# Patient Record
Sex: FEMALE | Race: ASIAN | NOT HISPANIC OR LATINO | URBAN - METROPOLITAN AREA
[De-identification: names, ages, dates, MRNs, and addresses within clinical notes are randomized per-mention and may not be internally consistent; named-entity substitution may affect disease eponyms.]

---

## 2017-07-03 ENCOUNTER — OFFICE VISIT - HEALTHEAST (OUTPATIENT)
Dept: FAMILY MEDICINE | Facility: CLINIC | Age: 82
End: 2017-07-03

## 2017-07-03 ENCOUNTER — RECORDS - HEALTHEAST (OUTPATIENT)
Dept: GENERAL RADIOLOGY | Facility: CLINIC | Age: 82
End: 2017-07-03

## 2017-07-03 DIAGNOSIS — R11.0 NAUSEA: ICD-10-CM

## 2017-07-03 DIAGNOSIS — R09.89 PULSE IRREGULARITY: ICD-10-CM

## 2017-07-03 DIAGNOSIS — R53.83 FATIGUE: ICD-10-CM

## 2017-07-03 DIAGNOSIS — R53.83 OTHER FATIGUE: ICD-10-CM

## 2017-07-03 ASSESSMENT — MIFFLIN-ST. JEOR: SCORE: 738.14

## 2017-07-05 LAB
ATRIAL RATE - MUSE: 75 BPM
DIASTOLIC BLOOD PRESSURE - MUSE: NORMAL MMHG
INTERPRETATION ECG - MUSE: NORMAL
P AXIS - MUSE: 30 DEGREES
PR INTERVAL - MUSE: 182 MS
QRS DURATION - MUSE: 80 MS
QT - MUSE: 418 MS
QTC - MUSE: 466 MS
R AXIS - MUSE: 2 DEGREES
SYSTOLIC BLOOD PRESSURE - MUSE: NORMAL MMHG
T AXIS - MUSE: 16 DEGREES
VENTRICULAR RATE- MUSE: 75 BPM

## 2017-09-03 ENCOUNTER — COMMUNICATION - HEALTHEAST (OUTPATIENT)
Dept: FAMILY MEDICINE | Facility: CLINIC | Age: 82
End: 2017-09-03

## 2017-09-03 DIAGNOSIS — K21.9 GERD (GASTROESOPHAGEAL REFLUX DISEASE): ICD-10-CM

## 2019-05-07 ENCOUNTER — COMMUNICATION - HEALTHEAST (OUTPATIENT)
Dept: FAMILY MEDICINE | Facility: CLINIC | Age: 84
End: 2019-05-07

## 2019-05-07 ENCOUNTER — OFFICE VISIT - HEALTHEAST (OUTPATIENT)
Dept: FAMILY MEDICINE | Facility: CLINIC | Age: 84
End: 2019-05-07

## 2019-05-07 DIAGNOSIS — R11.0 NAUSEA: ICD-10-CM

## 2019-05-07 DIAGNOSIS — R76.12 POSITIVE QUANTIFERON-TB GOLD TEST: ICD-10-CM

## 2019-05-07 DIAGNOSIS — R53.83 FATIGUE, UNSPECIFIED TYPE: ICD-10-CM

## 2019-05-07 DIAGNOSIS — R91.8 LUNG INFILTRATE: ICD-10-CM

## 2019-05-07 DIAGNOSIS — K21.9 GERD (GASTROESOPHAGEAL REFLUX DISEASE): ICD-10-CM

## 2019-05-07 LAB
ALBUMIN SERPL-MCNC: 3.7 G/DL (ref 3.5–5)
ALP SERPL-CCNC: 59 U/L (ref 45–120)
ALT SERPL W P-5'-P-CCNC: 13 U/L (ref 0–45)
ANION GAP SERPL CALCULATED.3IONS-SCNC: 10 MMOL/L (ref 5–18)
AST SERPL W P-5'-P-CCNC: 23 U/L (ref 0–40)
BILIRUB SERPL-MCNC: 0.3 MG/DL (ref 0–1)
BUN SERPL-MCNC: 8 MG/DL (ref 8–28)
CALCIUM SERPL-MCNC: 9.3 MG/DL (ref 8.5–10.5)
CHLORIDE BLD-SCNC: 107 MMOL/L (ref 98–107)
CO2 SERPL-SCNC: 24 MMOL/L (ref 22–31)
CREAT SERPL-MCNC: 0.93 MG/DL (ref 0.6–1.1)
ERYTHROCYTE [DISTWIDTH] IN BLOOD BY AUTOMATED COUNT: 13.5 % (ref 11–14.5)
GFR SERPL CREATININE-BSD FRML MDRD: 57 ML/MIN/1.73M2
GLUCOSE BLD-MCNC: 97 MG/DL (ref 70–125)
HCT VFR BLD AUTO: 40.9 % (ref 35–47)
HGB BLD-MCNC: 13.2 G/DL (ref 12–16)
LIPASE SERPL-CCNC: 21 U/L (ref 0–52)
MCH RBC QN AUTO: 29.8 PG (ref 27–34)
MCHC RBC AUTO-ENTMCNC: 32.4 G/DL (ref 32–36)
MCV RBC AUTO: 92 FL (ref 80–100)
PLATELET # BLD AUTO: 219 THOU/UL (ref 140–440)
PMV BLD AUTO: 7.7 FL (ref 7–10)
POTASSIUM BLD-SCNC: 3.4 MMOL/L (ref 3.5–5)
PROT SERPL-MCNC: 7 G/DL (ref 6–8)
RBC # BLD AUTO: 4.44 MILL/UL (ref 3.8–5.4)
SODIUM SERPL-SCNC: 141 MMOL/L (ref 136–145)
TSH SERPL DL<=0.005 MIU/L-ACNC: 5.32 UIU/ML (ref 0.3–5)
WBC: 6.3 THOU/UL (ref 4–11)

## 2019-05-13 ENCOUNTER — COMMUNICATION - HEALTHEAST (OUTPATIENT)
Dept: FAMILY MEDICINE | Facility: CLINIC | Age: 84
End: 2019-05-13

## 2019-05-13 ENCOUNTER — OFFICE VISIT - HEALTHEAST (OUTPATIENT)
Dept: PULMONOLOGY | Facility: OTHER | Age: 84
End: 2019-05-13

## 2019-05-13 DIAGNOSIS — R76.12 POSITIVE QUANTIFERON-TB GOLD TEST: ICD-10-CM

## 2019-05-13 ASSESSMENT — MIFFLIN-ST. JEOR: SCORE: 765.36

## 2019-05-18 ENCOUNTER — AMBULATORY - HEALTHEAST (OUTPATIENT)
Dept: LAB | Facility: HOSPITAL | Age: 84
End: 2019-05-18

## 2019-05-18 DIAGNOSIS — R76.12 POSITIVE QUANTIFERON-TB GOLD TEST: ICD-10-CM

## 2019-05-20 ENCOUNTER — HOSPITAL ENCOUNTER (OUTPATIENT)
Dept: CT IMAGING | Facility: HOSPITAL | Age: 84
Discharge: HOME | End: 2019-05-20
Attending: INTERNAL MEDICINE

## 2019-05-20 DIAGNOSIS — R76.12 POSITIVE QUANTIFERON-TB GOLD TEST: ICD-10-CM

## 2019-05-21 ENCOUNTER — OFFICE VISIT - HEALTHEAST (OUTPATIENT)
Dept: FAMILY MEDICINE | Facility: CLINIC | Age: 84
End: 2019-05-21

## 2019-05-21 DIAGNOSIS — R76.12 POSITIVE QUANTIFERON-TB GOLD TEST: ICD-10-CM

## 2019-05-21 DIAGNOSIS — R51.9 NONINTRACTABLE HEADACHE, UNSPECIFIED CHRONICITY PATTERN, UNSPECIFIED HEADACHE TYPE: ICD-10-CM

## 2019-05-21 DIAGNOSIS — R63.0 ANOREXIA: ICD-10-CM

## 2019-05-21 DIAGNOSIS — R91.8 LUNG INFILTRATE: ICD-10-CM

## 2019-05-21 DIAGNOSIS — E63.9 POOR NUTRITION: ICD-10-CM

## 2019-06-21 ENCOUNTER — OFFICE VISIT - HEALTHEAST (OUTPATIENT)
Dept: PULMONOLOGY | Facility: OTHER | Age: 84
End: 2019-06-21

## 2019-06-21 DIAGNOSIS — R93.89 ABNORMAL CT OF THE CHEST: ICD-10-CM

## 2019-06-21 ASSESSMENT — MIFFLIN-ST. JEOR: SCORE: 760.82

## 2019-07-01 LAB
ACID FAST STN SPEC QL: NORMAL
BACTERIA SPEC CULT: NORMAL

## 2020-08-19 ENCOUNTER — COMMUNICATION - HEALTHEAST (OUTPATIENT)
Dept: FAMILY MEDICINE | Facility: CLINIC | Age: 85
End: 2020-08-19

## 2021-03-19 ENCOUNTER — COMMUNICATION - HEALTHEAST (OUTPATIENT)
Dept: SCHEDULING | Facility: CLINIC | Age: 86
End: 2021-03-19

## 2021-03-19 ENCOUNTER — OFFICE VISIT - HEALTHEAST (OUTPATIENT)
Dept: FAMILY MEDICINE | Facility: CLINIC | Age: 86
End: 2021-03-19

## 2021-03-19 DIAGNOSIS — R22.0 LEFT FACIAL SWELLING: ICD-10-CM

## 2021-03-19 DIAGNOSIS — R21 RASH: ICD-10-CM

## 2021-03-22 ENCOUNTER — COMMUNICATION - HEALTHEAST (OUTPATIENT)
Dept: FAMILY MEDICINE | Facility: CLINIC | Age: 86
End: 2021-03-22

## 2021-03-23 ENCOUNTER — COMMUNICATION - HEALTHEAST (OUTPATIENT)
Dept: FAMILY MEDICINE | Facility: CLINIC | Age: 86
End: 2021-03-23

## 2021-03-23 ENCOUNTER — OFFICE VISIT - HEALTHEAST (OUTPATIENT)
Dept: PHARMACY | Facility: CLINIC | Age: 86
End: 2021-03-23

## 2021-03-23 DIAGNOSIS — B02.22 TRIGEMINAL HERPES ZOSTER: ICD-10-CM

## 2021-03-23 DIAGNOSIS — L03.211 FACIAL CELLULITIS: ICD-10-CM

## 2021-03-23 DIAGNOSIS — I21.4 NSTEMI (NON-ST ELEVATED MYOCARDIAL INFARCTION) (H): ICD-10-CM

## 2021-03-23 DIAGNOSIS — M79.605 PAIN OF LEFT LOWER EXTREMITY: ICD-10-CM

## 2021-03-23 PROCEDURE — 99605 MTMS BY PHARM NP 15 MIN: CPT | Performed by: PHARMACIST

## 2021-03-23 PROCEDURE — 99607 MTMS BY PHARM ADDL 15 MIN: CPT | Performed by: PHARMACIST

## 2021-05-27 VITALS
HEART RATE: 91 BPM | DIASTOLIC BLOOD PRESSURE: 70 MMHG | OXYGEN SATURATION: 95 % | TEMPERATURE: 100.3 F | RESPIRATION RATE: 16 BRPM | SYSTOLIC BLOOD PRESSURE: 108 MMHG

## 2021-05-28 NOTE — TELEPHONE ENCOUNTER
Spoke to Daughter Dejon Evans (CTC on file) and relayed message. They will go  the medication.     Please call patient to schedule for referral.     Completing task.

## 2021-05-28 NOTE — TELEPHONE ENCOUNTER
----- Message from Ana Boss MD sent at 5/13/2019  1:48 PM CDT -----  Regarding: ID consult for latent tb and echo for pleural effusion  Hi,    I just saw above pt.  I reviewed her records and images. She had a left lower lobe density/effusion, which is not typical of tb. She also does not have much in terms of cough, night sweats or weight loss. 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 I would recommend sending her to ID for latent Tb. I would also recommend an echo because of her pleural effusion.     With respect to her density left lower lobe, will be getting a CT chest and see what it shows.    Thanks and please don't hesitate to call me with questions.    Ana

## 2021-05-28 NOTE — PROGRESS NOTES
Subjective: This patient comes in for evaluation of nausea.  Patient has been seen here intermittently over the past number of years.  She was seen by Dr. Walter for a physical/wellness visit back in July 2017.  She had some nausea then was placed on some omeprazole is unclear if she did improve with that.    Her weight then was about 96 pounds she is actually 6 pounds heavier now.    In reviewing old charts she did have a positive QuantiFERON gold in 2015 and Dr. Hannah had talked to the family and the health department.  Patient had a positive PPD with refugee screening back in 2007.    I reviewed the phone message from 2015 and it looks like Dr. Hannah talked to the patient's son and left a message about getting treated here or at the health department    The patient was here with her daughter Rex Thornton believe that she is been treated    She has not really had any chest pain or shortness of breath.    She did have a chest x-ray back in 2017 which was fairly normal.    The patient does not really have reflux but just mainly has nausea.  No vomiting no diarrhea no fever no chills.    Patient has had fatigue and is in bed a lot during the day.    I did check labs today including CBC TSH CMP lipase and also his chest x-ray.  Will contact patient regarding the results    Tobacco status: She  reports that she has never smoked. She has never used smokeless tobacco.    Patient Active Problem List    Diagnosis Date Noted     Vision loss 12/07/2015     Fracture of fourth metacarpal bone, closed, initial encounter 12/07/2015     Risk for falls 12/07/2015     Localized Osteoarthritis Of The Wrist        Current Outpatient Medications   Medication Sig Dispense Refill     levoFLOXacin (LEVAQUIN) 250 MG tablet Take 1 tablet (250 mg total) by mouth daily for 10 days. 10 tablet 0     omeprazole (PRILOSEC) 20 MG capsule TAKE 1 PILL (20 MG TOTAL) BY MOUTH EVERY DAY BEFORE BREAKFAST/ TXHUA HNUB NOJ 1 LUB TSHUAJ UA NTEJ NOJ TSHAIS PAB ZOO  LUB PLAB 30 capsule 1     No current facility-administered medications for this visit.        ROS: 10 point review of systems positive as outlined above otherwise negative    Objective:    /75 (Patient Site: Left Arm, Patient Position: Sitting, Cuff Size: Adult Regular)   Pulse 100   Resp 18   Wt 102 lb (46.3 kg)   BMI 20.96 kg/m    Body mass index is 20.96 kg/m .      General appearance no acute distress    HEENT neck is negative oropharynx clear pupils react normally    Lungs had some diminished breath sounds but no wheezes or rales.    Heart was regular rate     Abdomen nontender no guarding or rebound    Skin was normal no rashes    No peripheral edema    Neck without JVD    No focal weakness or numbness    White count and hemoglobin normal.    TSH CMP lipase pending    Chest x-ray came back showing enlargement of cardiac silhouette.  Also increased opacity both lung bases worse on the left appears to be a lower lobe infiltrate which could represent acute pneumonia.            Results for orders placed or performed in visit on 05/07/19   HM2(CBC w/o Differential)   Result Value Ref Range    WBC 6.3 4.0 - 11.0 thou/uL    RBC 4.44 3.80 - 5.40 mill/uL    Hemoglobin 13.2 12.0 - 16.0 g/dL    Hematocrit 40.9 35.0 - 47.0 %    MCV 92 80 - 100 fL    MCH 29.8 27.0 - 34.0 pg    MCHC 32.4 32.0 - 36.0 g/dL    RDW 13.5 11.0 - 14.5 %    Platelets 219 140 - 440 thou/uL    MPV 7.7 7.0 - 10.0 fL       Assessment:  1. Nausea  Comprehensive Metabolic Panel    Lipase   2. Positive QuantiFERON-TB Gold test  XR Chest 2 Views    Ambulatory referral to Pulmonology   3. Fatigue, unspecified type  HM2(CBC w/o Differential)    Thyroid Stimulating Hormone (TSH)   4. GERD (gastroesophageal reflux disease)  omeprazole (PRILOSEC) 20 MG capsule    DISCONTINUED: omeprazole (PRILOSEC) 20 MG capsule   5. Lung infiltrate  Ambulatory referral to Pulmonology    levoFLOXacin (LEVAQUIN) 250 MG tablet     6.  Enlargement of cardiac  silhouette on chest x-ray    Patient will be started on Levaquin 250 mg 1 a day for 10 days.  I will have her see pulmonary regarding the abnormal chest x-ray and a positive PPD and QuantiFERON gold which is not been treated.    GERD history we will treat empirically with omeprazole.    For follow-up clinic visit in 2 weeks consider echocardiogram    Plan: Await additional labs as well.    This transcription uses voice recognition software, which may contain typographical errors.

## 2021-05-28 NOTE — PROGRESS NOTES
"Pulmonary Clinic Consult Note  Date of Service: 5/13/2019    Reason for Consultation: Abnormal chest x-ray    History:     HPI: Patient is a pleasant 89-year-old female, lifelong, from [facility] was referred here for abnormal chest x-ray.  Apparently, patient had a chest x-ray back on 5/2019 that showed increased opacity at the bases worse on the left, questionable if this is related to pneumonia.  Because patient has positive QuantiFERON back in 2015, she was referred to pulmonary clinic.  Per the notes, patient also appears to have positive PPD back in 2007. She was treated with levaquin 10 day course 5/2019 and sent here for evaluation.     Patient notes that she has shortness of breath with exertion however this is unchanged from baseline.  She denies any chronic cough that she has had for many years.  She denies any hemoptysis.  She denies any night sweats or weight loss.  She denies any previous history of aspiration.    [place].  She has not travelled back to [facility] since. She doesn't know if she ever had positive. She does not recall being treated for latent Tb.     Per chart, pt is 89 yo but she notes that she is 63 years only.    PMHx/PSHx:  GERD  hypothyroidism  Osteoarthritis    Social Hx:  Lifelong non-smoker  Lives with daughter and son in law    Review of Systems - 10 point review of system negative except for what is mentioned in the HPI.    Meds and Allergies:  See EHR for the updated medication list and Allergies. These were reviewed.     No family history on file.      Exam/Data:   /60   Pulse 89   Resp 12   Ht 4' 10.5\" (1.486 m)   Wt 102 lb (46.3 kg)   SpO2 97%   Breastfeeding? No   BMI 20.96 kg/m      EXAM:  GEN: comfortable, NAD  LN: no cervical LAD   CVS: S1S2, RRR  Lung: good air entry bilaterally, no wheezing  Abd: soft, nt, + BS. No masses  Ext: no c/c/e  Vasc: intact radial pulses bilaterally  Neuro: nonfocal  Skin: no visible " rash  Musculoskeletal: FROM all extremities  Psych: normal affect    DATA    IMAGING: personally reviewed images  Xr Chest 2 Views    Result Date: 5/7/2019  EXAM: XR CHEST 2 VIEWS LOCATION: [facility] DATE/TIME: 5/7/2019 4:14 PM INDICATION: Nonspecific reaction to cell mediated immunity measurement of gamma interferon antigen response without active tuberculosis COMPARISON: 07/03/2017 FINDINGS: Convex rightward mid thoracic scoliosis. Interval enlargement of the cardiac silhouette which could be due to multichamber enlargement, or pericardial effusion. Increased opacity both lung bases, worse on the left. On the lateral film there appears to be a lower lobe infiltrate which could represent acute pneumonia. No effusion. Note that primary tuberculosis can appear as an acute pneumonia. Report called to the clinic by QC. NOTE: ABNORMAL REPORT THE DICTATION ABOVE DESCRIBES AN ABNORMALITY FOR WHICH FOLLOW-UP IS NEEDED.       Assessment/Plan:   Norberto Real is a 89 y.o. female, lifelong non-smoker, who had an abnormal chest x-ray showing left lower lobe density in the setting of positive QuantiFERON that was done several years ago.  Patient does not recall ever being treated for lesion tuberculosis.  She does not have any type B symptoms including cough, night sweats, weight loss.  Suspicion for tuberculosis is low.    Recommendations:  Sputum Gstain and culture  Sputum for AFB x 3  CT chest with contrast to further evaluate left lower lobe densitity  Consider ID for possible tx of latent tb  Consider echo to assess pleural effusion  Sent inbasket to primary    FOLLOW UP: 4 weeks    Ana Boss MD  Pulmonary and Critical Care Medicine  5/13/2019        No Known Allergies    Medications:     Current Outpatient Medications   Medication Sig Dispense Refill     levoFLOXacin (LEVAQUIN) 250 MG tablet Take 1 tablet (250 mg total) by mouth daily for 10 days. 10 tablet 0     omeprazole (PRILOSEC) 20 MG capsule TAKE 1 PILL (20  MG TOTAL) BY MOUTH EVERY DAY BEFORE BREAKFAST/ TXHUA HNUB NOJ 1 LUB TSHUAJ UA NTEJ NOJ TSHAIS PAB ZOO LUB PLAB 30 capsule 1     No current facility-administered medications for this visit.        Much or all of the text in this note was generated through the use of the Dragon Dictate voice-to-text software. Errors in spelling or words which seem out of context are unintentional. Sound alike errors, in particular, may have escaped editing.

## 2021-05-28 NOTE — TELEPHONE ENCOUNTER
----- Message from Saurabh Goldstein MD sent at 5/7/2019  4:56 PM CDT -----  Please contact this patient tonight I will start her on Levaquin 250 mg 1 a day for 10 days    Also I would like her to see pulmonary.  I put in a referral.    Please have her get scheduled for that as well    She should follow-up at the clinic here in 2 weeks

## 2021-05-29 ENCOUNTER — RECORDS - HEALTHEAST (OUTPATIENT)
Dept: ADMINISTRATIVE | Facility: CLINIC | Age: 86
End: 2021-05-29

## 2021-05-29 NOTE — PROGRESS NOTES
Subjective: Patient comes in with her daughter.  She was seen back on 5/7/2019.  Please see discussion then she had had some weight loss anorexia cough etc.    Had previous what sounds like untreated latent tuberculosis.  She had a positive PPD many years ago and had a positive QuantiFERON gold few years ago please see previous discussion    Her x-ray was abnormal please see discussion from 5/7/2019    I did treat her for acute infiltrate with Levaquin and had her see the pulmonologist    She saw Dr. Boss.  Patient's sputum's have been negative thus far regarding TB, no acid-fast bacilli is seen.    Patient had a CT scan done yesterday she has not heard the results.    They compared it with the chest x-ray from 5/7/2019 and it looked better improved with resolution of the focal infiltrate.  There is a number of granulomatous changes and fibrosis.    She has a follow-up with pulmonary on 6/21/2019.    I need to see her back for recheck in early July.    All she has had pretty much the same way down a half a pound she continues with poor appetite and fatigue.    I did give her prescriptions for multivitamins and fluid supplement, Ensure.    Also she gets intermittent headaches and I gave her a prescription for Tylenol for that 325 mg 1-2 3 times daily as needed    Tobacco status: She  reports that she has never smoked. She has never used smokeless tobacco.    Patient Active Problem List    Diagnosis Date Noted     Vision loss 12/07/2015     Fracture of fourth metacarpal bone, closed, initial encounter 12/07/2015     Risk for falls 12/07/2015     Localized Osteoarthritis Of The Wrist        Current Outpatient Medications   Medication Sig Dispense Refill     acetaminophen (TYLENOL) 325 MG tablet Take 2 tablets (650 mg total) by mouth every 6 (six) hours as needed for pain. 80 tablet 2     food supplemt, lactose-reduced (ENSURE HIGH PROTEIN) Liqd 1 can po daily 30 Can 12     multivitamin (ONE A DAY) per tablet Take 1  tablet by mouth daily. 120 tablet 2     omeprazole (PRILOSEC) 20 MG capsule TAKE 1 PILL (20 MG TOTAL) BY MOUTH EVERY DAY BEFORE BREAKFAST/ TXHUA HNUB NOJ 1 LUB TSHUAJ UA NTEJ NOJ TSHAIS PAB ZOO LUB PLAB 30 capsule 1     No current facility-administered medications for this visit.        ROS:   10 point review of systems positive as discussed above otherwise negative    Objective:    /78 (Patient Site: Left Arm, Patient Position: Sitting, Cuff Size: Adult Small)   Pulse 60   Resp 12   Wt 101 lb (45.8 kg)   BMI 20.75 kg/m    Body mass index is 20.75 kg/m .      General appearance no acute distress    HEENT no adenopathy oropharynx is clear nose is clear    Heart was regular S1-S2 rate at 60-70    Abdomen nontender    She has no peripheral edema    Lungs had some diminished breath sounds as before but better air exchange.  No rales no rhonchi.    Skin was normal no rashes    AFB stains were negative for acid-fast bacilli x3.    CT scan as discussed showed improvement with resolution of focal infiltrate.    Results for orders placed or performed in visit on 05/18/19   Culture/Stain, Mycobacterium, Respiratory Specimen   Result Value Ref Range    AFB Stain No acid fast bacilli seen    Culture/Stain, Mycobacterium, Respiratory Specimen   Result Value Ref Range    AFB Stain No acid fast bacilli seen    Culture/Stain, Mycobacterium, Respiratory Specimen   Result Value Ref Range    AFB Stain No acid fast bacilli seen        Assessment:  1. Lung infiltrate     2. Anorexia  food supplemt, lactose-reduced (ENSURE HIGH PROTEIN) Liqd    multivitamin (ONE A DAY) per tablet   3. Poor nutrition  food supplemt, lactose-reduced (ENSURE HIGH PROTEIN) Liqd    multivitamin (ONE A DAY) per tablet   4. Nonintractable headache, unspecified chronicity pattern, unspecified headache type  acetaminophen (TYLENOL) 325 MG tablet   5. Positive QuantiFERON-TB Gold test       Lung infiltrate probable recent pneumonia resolved with  Levaquin    Anorexia poor nutrition we will use fluid supplement and multivitamins.    Headache nonspecific can use Tylenol    Previous positive QuantiFERON gold I discussed treatment for latent tuberculosis with patient and daughter.  She would rather not take medications if she does not have an active case of TB.    We will discuss this again when I see her in 6 weeks.  I did discuss the risk benefit regarding that.    Plan: As discussed above    This transcription uses voice recognition software, which may contain typographical errors.

## 2021-05-29 NOTE — PROGRESS NOTES
"PULMONARY CLINIC FOLLOW UP NOTE    History:     HPI: Norberto Real is a 89 y.o. female, lifelong, from [facility], who was referred to pulm clinic b/c of abnormal CXR in setting of positive PPD.  PT had a CXR back on 5/2019 that showed increased opacity at the bases worse on the left, questionable if this is related to pneumonia.  She was given a course of levaquin for 10 days on 5/2019.  Because patient has positive QuantiFERON back in 2015, she was referred to pulmonary clinic.  Per the notes, patient also appears to have positive PPD back in 2007.   [place], and not travelled back to [facility]. She does not recall being treated for latent TB.  Of note, pt is 63 years old however per chart, she is 89 years old.    Interval History: Patient is here for her scheduled visit.  She denies any changes of her symptoms.  She does have shortness of breath and chronic cough that is dry for many years.  These are unchanged.  She denies any functional limitations.  No hemoptysis, night sweats or weight loss.  No antibiotics since he was last seen.    PMHx/PSHx:  GERD  hypothyroidism  Osteoarthritis     Social Hx:  Lifelong non-smoker  Lives with daughter and son in law    ROS: 10 point review of system done. Pertinent findings are noted in the HPI.    Exam/Data:   /60   Pulse 69   Resp 12   Ht 4' 10.5\" (1.486 m)   Wt 101 lb (45.8 kg)   SpO2 96%   Breastfeeding? No   BMI 20.75 kg/m  , Body mass index is 20.75 kg/m .  GEN: comfortable, NAD  HEENT: NCAT, EMOI, mmm  CVS: S1S2, RRR  Lung: good air entry bilaterally  Abd: soft, nt, + BS. No masses  Ext: no c/c/e  Neuro: nonfocal  Skin: no visible rash  Vasc: intact radial pulses bilaterally  Musculoskeletal: FROM all extremities  Psych: normal affect    Data:   Labs and Imaging personally reviewed.  Pertinent findings include:    Result Date: 5/7/2019  EXAM: XR CHEST 2 VIEWS LOCATION: [facility] [place] DATE/TIME: 5/7/2019 4:14 PM INDICATION: " Nonspecific reaction to cell mediated immunity measurement of gamma interferon antigen response without active tuberculosis COMPARISON: 07/03/2017 FINDINGS: Convex rightward mid thoracic scoliosis. Interval enlargement of the cardiac silhouette which could be due to multichamber enlargement, or pericardial effusion. Increased opacity both lung bases, worse on the left. On the lateral film there appears to be a lower lobe infiltrate which could represent acute pneumonia. No effusion. Note that primary tuberculosis can appear as an acute pneumonia. Report called to the clinic by QC. NOTE: ABNORMAL REPORT THE DICTATION ABOVE DESCRIBES AN ABNORMALITY FOR WHICH FOLLOW-UP IS NEEDED.     CT chest on 5/20/2019:  1.  Improvement compared to prior plain film with resolution of focal infiltrate/pleural fluid at left lung base.  2.  Old granulomatous disease exposure with innumerable small pulmonary nodules, upper lobe fibrosis and calcified thoracic lymph nodes are chronic.    Assessment/Plan:     Norberto Real is a 89 y.o. female, lifelong non-smoker, who had an abnormal chest x-ray showing left lower lobe density in the setting of positive QuantiFERON that was done several years ago.  Patient does not recall ever being treated for lesion tuberculosis.  She does not have any type B symptoms including cough, night sweats, weight loss.      AFB x 3 negative arguing against active TB. CT chest shows improvement/resolution in her left sided infiltrate as pt was treated for PNA.  CT shows changes likely related to old tb but unchanged form CT done 13 years although I am not able to see it.    FOLLOW UP: as needed    Ana Boss MD  Pulmonary and Critical Care Medicine  Electronically Signed on 6/21/2019    Current Outpatient Medications   Medication Sig Dispense Refill     acetaminophen (TYLENOL) 325 MG tablet Take 2 tablets (650 mg total) by mouth every 6 (six) hours as needed for pain. 80 tablet 2     food supplemt,  lactose-reduced (ENSURE HIGH PROTEIN) Liqd 1 can po daily 30 Can 12     multivitamin (ONE A DAY) per tablet Take 1 tablet by mouth daily. 120 tablet 2     omeprazole (PRILOSEC) 20 MG capsule TAKE 1 PILL (20 MG TOTAL) BY MOUTH EVERY DAY BEFORE BREAKFAST/ TXHUA HNUB NOJ 1 LUB TSHUAJ UA NTEJ NOJ TSHAIS PAB ZOO LUB PLAB 30 capsule 1     No current facility-administered medications for this visit.      No Known Allergies    Meds and Allergies: See EHR for the updated medication list and Allergies. These were reviewed.     Much or all of the text in this note was generated through the use of the Dragon Dictate voice-to-text software. Errors in spelling or words which seem out of context are unintentional. Sound alike errors, in particular, may have escaped editing.

## 2021-05-31 VITALS — WEIGHT: 96 LBS | BODY MASS INDEX: 19.35 KG/M2 | HEIGHT: 59 IN

## 2021-06-02 VITALS — BODY MASS INDEX: 20.75 KG/M2 | WEIGHT: 101 LBS

## 2021-06-02 VITALS — BODY MASS INDEX: 20.56 KG/M2 | WEIGHT: 102 LBS | HEIGHT: 59 IN

## 2021-06-02 VITALS — WEIGHT: 102 LBS | BODY MASS INDEX: 20.96 KG/M2

## 2021-06-03 VITALS — BODY MASS INDEX: 20.36 KG/M2 | WEIGHT: 101 LBS | HEIGHT: 59 IN

## 2021-06-10 NOTE — TELEPHONE ENCOUNTER
----- Message from Gavin Orourke CMA sent at 6/10/2020  8:17 AM CDT -----      ----- Message -----  From: Cuauhtemoc Carbajal MD  Sent: 6/9/2020   1:11 PM CDT  To: [facility] Family Medicine/Ob Support Pool    Please set up Annual Wellness visit virtual visit.

## 2021-06-10 NOTE — TELEPHONE ENCOUNTER
"Called and left message for pt to return call.  \" Okay to relay message\"  Upon call back please help patient make a AWV with provider. Face to face or Virtual Video  "

## 2021-06-11 NOTE — PROGRESS NOTES
Assessment/ Plan  1. Nausea  2. Fatigue  In 89-year-old elderly female.  Etiology not clear.  Plan is trial of omeprazole, await labs.  Follow-up 1-2 weeks with myself or Dr. Chandra  - Comprehensive Metabolic Panel  - HM2(CBC w/o Differential)  - Electrocardiogram Perform - Clinic  - Erythrocyte Sedimentation Rate  - XR Chest PA and Lateral; Future  - Urinalysis-UC if Indicated  - Culture, Urine    3. Pulse irregularity  Sinus irregularity, await cardiology overread of EKG  - Electrocardiogram Perform - Clinic      Also, briefly discussed pain medication for neck pain.  Recommended ongoing use of Tylenol and add topical salicylate.  Discussed my hesitation to use nonsteroidal medication in a patient of this age with nausea as well as opiates.  Body mass index is 19.72 kg/(m^2).    Subjective  CC:  Chief Complaint   Patient presents with     Annual Exam     loss appitite, dizzy, vomit     Eye Problem     blurry vision     HPI:  Patient lives with her son and family.  Planes of feeling poorly for the last month with nausea, no vomiting and weight loss    Nausea and Vomiting  Narrative-elderly female, gradual onset of feeling poorly.  ___________________________  Notes  Duration/ Timing/ Frequency/context-1 month- comes and goes but more frequent lately.  Doesn't feel like eating    Abdominal Pain? no  Diarrhea? No    Other associated symptoms? Neck tension, energy is okay except with nausea.    Severity?-Comes and goes, at times moderate  Trigger/ anything make it worse?  No  Anything make it better?-No      St is up 1 lbs in the last 18 months, but patient believes that she has lost weight recently    Also complains of neck pain and tension for which she takes Tylenol.  Tylenol is not working well enough.    PFSH:  Current medications reviewed as follows:  Current Outpatient Prescriptions on File Prior to Visit   Medication Sig     ibuprofen 200 mg cap Take 400 mg by mouth every 4 (four) hours as needed (Pain (use  "acetaminophen first).).     No current facility-administered medications on file prior to visit.      Patient Active Problem List    Diagnosis Date Noted     Vision loss 12/07/2015     Fracture of fourth metacarpal bone, closed, initial encounter 12/07/2015     Overview Note:     Replacement Utility updated for latest IMO load       Risk for falls 12/07/2015     Overview Note:     Fall 12/4/15 (with fracture). Vision loss. Suspect osteoporosis.       Localized Osteoarthritis Of The Wrist      Overview Note:     s/p fracture '75    Replacement Utility updated for latest IMO load       History   Smoking Status     Never Smoker   Smokeless Tobacco     Not on file     Comment: No exposure to second hand smoking     Social History     Social History Narrative    Lives with daughter-in-law.     Patient Care Team:  Nenita Lopez MD as PCP - General (Family Medicine)  ROS  Her family, negative constitutional, respiratory, GI, cardiac, skin except as noted    Objective  Physical Exam  Vitals:    07/03/17 1317   BP: 90/50   Pulse: 91   Resp: 20   Temp: 98  F (36.7  C)   TempSrc: Oral   Weight: (!) 96 lb (43.5 kg)   Height: 4' 10.5\" (1.486 m)     Gen- alert, oriented/ appropriately responsive  HEENT- normal cephalic, atraumatic.   Chest- Normal inspiration and expiration.  Clear to ascultation.  No chest wall deformity or scar.  CV- Heart regular rate and rhythm, normal tones, no murmurs gallops or rubs.  Ext- appear well perfused, no edema  Skin- warm and dry, no visualized rash    Diagnostics  Results for orders placed or performed in visit on 07/03/17   HM2(CBC w/o Differential)   Result Value Ref Range    WBC 4.5 4.0 - 11.0 thou/uL    RBC 4.24 3.80 - 5.40 mill/uL    Hemoglobin 12.7 12.0 - 16.0 g/dL    Hematocrit 38.7 35.0 - 47.0 %    MCV 91 80 - 100 fL    MCH 29.8 27.0 - 34.0 pg    MCHC 32.7 32.0 - 36.0 g/dL    RDW 11.7 11.0 - 14.5 %    Platelets 196 140 - 440 thou/uL    MPV 7.3 7.0 - 10.0 fL   Erythrocyte Sedimentation " Rate   Result Value Ref Range    Sed Rate 35 (H) 0 - 20 mm/hr   Urinalysis-UC if Indicated   Result Value Ref Range    Color, UA Yellow Colorless, Yellow, Straw, Light Yellow    Clarity, UA Clear Clear    Glucose, UA Negative Negative    Bilirubin, UA Negative Negative    Ketones, UA Negative Negative    Specific Gravity, UA 1.010 1.005 - 1.030    Blood, UA Small (!) Negative    pH, UA 5.5 5.0 - 8.0    Protein, UA Negative Negative mg/dL    Urobilinogen, UA 0.2 E.U./dL 0.2 E.U./dL, 1.0 E.U./dL    Nitrite, UA Negative Negative    Leukocytes, UA Trace (!) Negative    Bacteria, UA Few (!) None Seen hpf    RBC, UA 3-5 (!) None Seen, 0-2 hpf    WBC, UA 0-5 None Seen, 0-5 hpf    Squam Epithel, UA 0-5 None Seen, 0-5 lpf   Electrocardiogram Perform - Clinic   Result Value Ref Range    SYSTOLIC BLOOD PRESSURE  mmHg    DIASTOLIC BLOOD PRESSURE  mmHg    VENTRICULAR RATE 75 BPM    ATRIAL RATE 75 BPM    P-R INTERVAL 182 ms    QRS DURATION 80 ms    Q-T INTERVAL 418 ms    QTC CALCULATION (BEZET) 466 ms    P Axis 30 degrees    R AXIS 2 degrees    T AXIS 16 degrees    MUSE DIAGNOSIS       Sinus rhythm with Premature atrial complexes in a pattern of bigeminy  Otherwise normal ECG  No previous ECGs available       Reviewed EKG and I question whether these beats are PACs or indeed sinus beats with marked sinus irregularity.  I favor the latter.    Please note: Voice recognition software was used in this dictation.  It may therefore contain typographical errors.

## 2021-06-16 PROBLEM — B02.9 HERPES ZOSTER: Status: ACTIVE | Noted: 2021-03-19

## 2021-06-16 PROBLEM — R79.89 ELEVATED TROPONIN: Status: ACTIVE | Noted: 2021-03-19

## 2021-06-16 NOTE — PATIENT INSTRUCTIONS - HE
Plan:                                                     1.  Educate to initiate aspirin (send refill to pharmacy)  2.  Educate to initiate atorvastatin  3.  Reminded of primary care follow up on 3/25/21    Follow Up  Return in about 2 days (around 3/25/2021) for Dr. Crabajal.

## 2021-06-16 NOTE — TELEPHONE ENCOUNTER
LMTCB #1 to see if patient was interested in getting the COVID-19 Vaccine at [facility] on 3/27 if patient hasn't already.    If patient got it, please notate this information down and re-route it to the provider pool. Thanks.    Name of vaccine:  Place of vaccination:  Date of 1st dose:  Date of 2nd dose:  Lot #: (patient may or may not have this)

## 2021-06-16 NOTE — PROGRESS NOTES
Assessment:     1. Rash     2. Left facial swelling            Plan:     Patient with 4-day history of slowly worsening painful and itchy rash on the left side of her face now involving her eyelid on the left.  Differential diagnosis includes zoster with concern for herpes ophthalmicus versus facial cellulitis.  Given her low-grade fever and degree of redness and swelling of her face and eyelid, recommend patient be seen in the emergency department for further evaluation and treatment.    Assessment requiring an independent historian(s) - family - son-in-law      Patient Instructions   I am concerned about the rash on the left side of your face, particularly the degree of swelling and pain around her eye.  Recommend that you be seen in the emergency department for further evaluation.    Subjective:       89 y.o. female presents for evaluation of a 4-day history of progressively worsening rash that is started on her forehead on the left side of her face and now is to be spreading.  She has developed significant swelling of the left side of her forehead and eyelid to the point that she is unable to open her eye.  She describes the pain initially as itchy and has now become quite painful.  She has also had associated headache, low-grade fever, chills, and body aches for the past couple of days.  She has not taken anything for her symptoms.    Patient Active Problem List   Diagnosis     Localized Osteoarthritis Of The Wrist     Vision loss     Fracture of fourth metacarpal bone, closed, initial encounter     Risk for falls       No past medical history on file.    No past surgical history on file.    Current Outpatient Medications on File Prior to Visit   Medication Sig Dispense Refill     acetaminophen (TYLENOL) 325 MG tablet Take 2 tablets (650 mg total) by mouth every 6 (six) hours as needed for pain. 80 tablet 2     food supplemt, lactose-reduced (ENSURE HIGH PROTEIN) Liqd 1 can po daily 30 Can 12     multivitamin  (ONE A DAY) per tablet Take 1 tablet by mouth daily. 120 tablet 2     omeprazole (PRILOSEC) 20 MG capsule TAKE 1 PILL (20 MG TOTAL) BY MOUTH EVERY DAY BEFORE BREAKFAST/ TXHUA HNUB NOJ 1 LUB TSHUAJ UA NTEJ NOJ TSHAIS PAB ZOO LUB PLAB 30 capsule 1     No current facility-administered medications on file prior to visit.        No Known Allergies      Review of Systems  A 12 point comprehensive review of systems was negative except as noted.      Objective:     Vitals:    03/19/21 1250   BP: 108/70   Pulse: 91   Resp: 16   Temp: 100.3  F (37.9  C)   SpO2: 95%      General appearance: alert, appears stated age and cooperative  Head: Patient noted to have a weepy, vesicular rash on the left side of her forehead with some areas scabbed over with surrounding redness.  Redness extends to involve her left eyelid and has significant degree of swelling around her left eye.  Eyes: Upper and lower eyelid on the left significantly edematous with some mild erythema noted.  Difficult to examine eye because of the large degree of swelling.  Lungs: clear to auscultation bilaterally             This note has been dictated using voice recognition software. Any grammatical or context distortions are unintentional and inherent to the software

## 2021-06-16 NOTE — PATIENT INSTRUCTIONS - HE
I am concerned about the rash on the left side of your face, particularly the degree of swelling and pain around her eye.  Recommend that you be seen in the emergency department for further evaluation.

## 2021-06-16 NOTE — PROGRESS NOTES
Medication Therapy Management (MTM) Encounter    Assessment:                                                      1. Trigeminal herpes zoster  Symptomatically with improvement per patient report, describing adherence to acyclovir without adverse effect or difficulty with administration.  Reminded her of follow-up appointment with primary care in 2 days, and recommendation to follow-up with ophthalmologist.    2. Facial cellulitis  Symptomatically improving per patient report, describing adherence to Augmentin without adverse effect or difficulty with administration.  Recommend reassess at follow-up in 2 days with primary care.    3. NSTEMI (non-ST elevated myocardial infarction) (H)  Nonadherent to aspirin and atorvastatin, she did not realize there were 2 additional medications, however these were sent to the pharmacy 1 day after discharge.  Provide education that these 2 medications will be long-term to help prevent heart attack.  Aspirin was not sent as a refill to the pharmacy, I have sent this now.  Recommend reassess medications at follow-up in primary care, I have encouraged her to bring all of her pill bottles with her to this visit.    4. Pain of left lower extremity  Stable, utilizing Tylenol at home.  Recommend reassess at follow-up.       Plan:                                                     1.  Educate to initiate aspirin (send refill to pharmacy)  2.  Educate to initiate atorvastatin  3.  Reminded of primary care follow up on 3/25/21    Follow Up  Return in about 2 days (around 3/25/2021) for Dr. Carbajal.    Subjective & Objective                                                       Norberto Real is a 89 y.o. female called for a transitions of care visit. she was discharged from [facility] on March 21, 2021 for herpes zoster.    Patient consented to a telehealth visit: Yes    Chief Complaint: Hospital follow-up    Medication Adherence/Access: She is appropriately taking acyclovir and Augmentin  "as directed at discharge, but she was not given aspirin and atorvastatin, these were sent to her local pharmacy.    Shingles: She was able to describe that she is taking the capsules 4 capsules per dose 4 times daily.  Notes that symptomatically she is feeling better, the pain is improved.  She is walking around the house more than she was prior to the hospital.  She does feel much better and is happy to be eating the foods that she likes now that she is back at home.  She has not yet made an appointment for the eye doctor but plans to do so.  She is using erythromycin ophthalmic ointment.  Per Dr. Nowak, \"Pt seen by ophthalmology, Hand written note placed in pt chart, Shingles with no ocular involvement\"     Facial cellulitis: She was able to describe that she is taking Augmentin twice daily, as directed.  She is improving symptomatically.  Denies signs or symptoms of adverse effects.    NSTEMI: She has not gotten aspirin and atorvastatin from the pharmacy.  She became a little flustered on the phone because she did not know there were 2 additional medications that she should be taking.  It looks like these were actually sent in yesterday, the day after discharge, to her local pharmacy.  She denies chest pain    Leg pain: She was given Tylenol at discharge.  Not discussed in depth today.    PMH: reviewed in EPIC   Allergies/ADRs: reviewed in EPIC   Alcohol: None  Tobacco:   Social History     Tobacco Use   Smoking Status Never Smoker   Smokeless Tobacco Never Used   Tobacco Comment    No exposure to second hand smoking     Recent Vitals:   BP Readings from Last 3 Encounters:   03/21/21 121/69   03/19/21 108/70   06/21/19 102/60      Wt Readings from Last 3 Encounters:   03/21/21 105 lb 9.6 oz (47.9 kg)   06/21/19 101 lb (45.8 kg)   05/21/19 101 lb (45.8 kg)     ----------------  Post Discharge Medication Reconciliation Status: discharge medications reconciled, continue medications without change    The patient " declined an after visit summary    I spent 15 minutes with this patient today;  . All changes were made via collaborative practice agreement with Nenita Lopez MD. A copy of the visit note was provided to the patient's provider.       Yoel Nice, PharmD, BCACP  Medication Management (MTM) Pharmacist  [facility]    Telemedicine Visit Details    Type of service:  Telephone     Start Time: 11AM  End Time (time video/phone call stopped): 11:15AM    Originating Location (pt. Location): Home    Distant Location (provider location):  [place] [facility]    Mode of Communication:   Telephone     Current Outpatient Medications   Medication Sig Dispense Refill     acetaminophen (TYLENOL) 500 MG tablet Take 500 mg by mouth every 6 (six) hours as needed for pain.       acyclovir (ZOVIRAX) 200 MG capsule Take 4 capsules (800 mg total) by mouth every 4 (four) hours for 7 days. 168 capsule 0     amoxicillin-clavulanate (AUGMENTIN) 875-125 mg per tablet Take 1 tablet by mouth 2 (two) times a day for 7 days. 14 tablet 0     aspirin 81 MG EC tablet Take 1 tablet (81 mg total) by mouth daily. 30 tablet 1     atorvastatin (LIPITOR) 80 MG tablet Take 1 tablet (80 mg total) by mouth daily. 30 tablet 0     erythromycin ophthalmic ointment To eyes prn 2 Tube 0     No current facility-administered medications for this visit.         Medication Therapy Recommendations  NSTEMI (non-ST elevated myocardial infarction) (H)    Current Medication: aspirin 81 MG EC tablet (Discontinued)   Rationale: Does not understand instructions - Adherence - Adherence   Recommendation: Provide Adherence Intervention - Educate to initiate aspirin daily   Status: Patient Agreed - Adherence/Education          Current Medication: atorvastatin (LIPITOR) 80 MG tablet   Rationale: Does not understand instructions - Adherence - Adherence   Recommendation: Provide Adherence Intervention - Educate to initiate  atorvastatin daily   Status: Patient Agreed - Adherence/Education

## 2021-06-17 NOTE — TELEPHONE ENCOUNTER
Telephone Encounter by Alicia Orourke at 3/22/2021 11:17 AM     Author: Alicia Orourke Service: -- Author Type: --    Filed: 3/22/2021 11:19 AM Encounter Date: 3/22/2021 Status: Signed    : Alicia Orourke        Inpatient follow up appt  Received: Yesterday     Schedule follow-up appointment  Message Contents   Nenita Lopez MD  P [facility] Scheduling Registration Pool             Please schedule hospital follow up with any available doctor for this week.     Nenita Lopez M.D.    Previous Messages

## 2021-06-21 NOTE — LETTER
Letter by Yoel Nice, PharmD at      Author: Yoel Nice, PharmD Service: -- Author Type: --    Filed:  Encounter Date: 3/23/2021 Status: (Other)           3/23/2021    Norberto Real  [address] [place] [facility] [address]        Dear Norberto Real     Thank you for talking with me on 3/23/21 about your health and medications. [facility] MTM (Medication Therapy Management) program helps you understand your medications and use them safely.      This letter includes an action plan (Medication Action Plan) and medication list (Personal Medication List). The action plan has steps you should take to help you get the best results from your medications. The medication list will help you keep track of your medications and how to use them the right way.       Have your action plan and medication list with you when you talk with your doctors, pharmacists, and other healthcare providers in your care team.     Ask your doctors, pharmacists, and other healthcare providers to update the action plan and medication list at every visit.     Take your medication list with you if you go to the hospital or emergency room.     Give a copy of the action plan and medication list to your family or caregivers.     If you want to talk about this letter or any of the papers with it, please call   [phone].   We look forward to working with you, your doctors, and other healthcare providers to help you stay healthy through the [facility] MTM program.    Sincerely,    Yoel Nice    Enclosed: Medication Action Plan and Personal Medication List  _  Medication Action Plan For Norberto Real, : 1928     This action plan will help you get the best results from your medications if you:     1. Read What we talked about.   2. Take the steps listed in the What I need to do boxes.   3. Fill in What I did and when I did it.   4. Fill in My follow-up plan and Questions I want to ask.     Have this action plan with you when you talk with your doctors,  pharmacists, and other healthcare providers in your care team. Share this with your family or caregivers too.    Date Prepared: 3/23/2021      What we talked about: NSTEMI                                                  What I need to do:   You were directed to start 2 new medications, aspirin 81 mg daily, and atorvastatin 20 mg daily.  Days two prescriptions are at your local pharmacy.       What I did and when I did it:                                              My follow-up plan:                 Questions I want to ask:              If you have any questions about your action plan, call Yoel Nice, Phone: [phone] , Monday-Friday 8-4:30pm.           PERSONAL MEDICATION LIST FOR NANCY Nur 1928     This medication list was made for you after we talked. We also used information from your doctor's chart.      Use blank rows to add new medications. Then fill in the dates you started using them.    Cross out medications when you no longer use them. Then write the date and why you stopped using them.    Ask your doctors, pharmacists, and other healthcare providers to update this list at every visit. Keep this list up-to-date with:       Prescription medications    Over the counter drugs     Herbals    Vitamins    Minerals      If you go to the hospital or emergency room, take this list with you. Share this with your family or caregivers too.     DATE PREPARED: 3/23/2021    Allergies or side effects: No Known Allergies      Medication: acetaminophen (TYLENOL) 500 MG tablet      How I use it: Take 500 mg by mouth every 6 (six) hours as needed for pain.      Why I use it: Leg Pain    Prescriber: Irvin Nowak MD      Date I started using it:     Date I stopped using it:       Why I stopped using it:          Medication: acyclovir (ZOVIRAX) 200 MG capsule      How I use it: Take 4 capsules (800 mg total) by mouth every 4 (four) hours for 7 days.      Why I use it: Herpes zoster with complication     Prescriber: Irvin Nowak MD      Date I started using it:     Date I stopped using it:       Why I stopped using it:          Medication: amoxicillin-clavulanate (AUGMENTIN) 875-125 mg per tablet      How I use it: Take 1 tablet by mouth 2 (two) times a day for 7 days.      Why I use it: Herpes zoster with complication    Prescriber: Irvin Nowak MD      Date I started using it:     Date I stopped using it:       Why I stopped using it:          Medication: aspirin 81 MG EC tablet      How I use it: Take 1 tablet (81 mg total) by mouth daily.      Why I use it: NSTEMI (Non-ST Elevated Myocardial Infarction) (H)    Prescriber: Nenita Lopez MD      Date I started using it:     Date I stopped using it:       Why I stopped using it:          Medication: atorvastatin (LIPITOR) 80 MG tablet      How I use it: Take 1 tablet (80 mg total) by mouth daily.      Why I use it: NSTEMI (Non-ST Elevated Myocardial Infarction) (H)    Prescriber: Irvin Nowak MD      Date I started using it:     Date I stopped using it:       Why I stopped using it:          Medication: erythromycin ophthalmic ointment      How I use it: Apply to eyes as needed      Why I use it: Herpes zoster with complication    Prescriber: Irvin Nowak MD      Date I started using it:     Date I stopped using it:       Why I stopped using it:          Medication:       How I use it:       Why I use it:     Prescriber:       Date I started using it:     Date I stopped using it:       Why I stopped using it:          Medication:       How I use it:       Why I use it:     Prescriber:       Date I started using it:     Date I stopped using it:       Why I stopped using it:          Medication:       How I use it:       Why I use it:     Prescriber:       Date I started using it:     Date I stopped using it:       Why I stopped using it:          Other Information:           If you have any questions about your medication list, call Yoel [place]  [place], Phone: [phone] , Monday-Friday 8-4:30pm.    According to the Paperwork Reduction Act of 1995, no persons are required to respond to a collection of information unless it displays a valid OMB control number. The valid OMB number for this information collection is [id number]. The time required to complete this information collection is estimated to average 40 minutes per response, including the time to review instructions, searching existing data resources, gather the data needed, and complete and review the information collection. If you have any comments concerning the accuracy of the time estimate(s) or suggestions for improving this form, please write to: CMS, Attn: [employer] Reports Clearance Officer, [address] [place] [phone].

## 2021-07-01 ENCOUNTER — HOSPITAL ENCOUNTER (EMERGENCY)
Dept: EMERGENCY MEDICINE | Facility: HOSPITAL | Age: 86
Discharge: HOME | End: 2021-07-02
Attending: EMERGENCY MEDICINE

## 2021-07-01 DIAGNOSIS — R06.00 DYSPNEA, UNSPECIFIED TYPE: ICD-10-CM

## 2021-07-01 DIAGNOSIS — R05.3 CHRONIC COUGH: ICD-10-CM

## 2021-07-01 LAB
ALBUMIN SERPL-MCNC: 3.3 G/DL (ref 3.5–5)
ALP SERPL-CCNC: 49 U/L (ref 45–120)
ALT SERPL W P-5'-P-CCNC: 10 U/L (ref 0–45)
ANION GAP SERPL CALCULATED.3IONS-SCNC: 14 MMOL/L (ref 5–18)
AST SERPL W P-5'-P-CCNC: 19 U/L (ref 0–40)
BASE EXCESS BLDV CALC-SCNC: 3.2 MMOL/L
BASOPHILS # BLD AUTO: 0 THOU/UL (ref 0–0.2)
BASOPHILS NFR BLD AUTO: 0 % (ref 0–2)
BILIRUB SERPL-MCNC: 1.3 MG/DL (ref 0–1)
BNP SERPL-MCNC: 45 PG/ML (ref 0–167)
BUN SERPL-MCNC: 6 MG/DL (ref 8–28)
CALCIUM SERPL-MCNC: 9.3 MG/DL (ref 8.5–10.5)
CHLORIDE BLD-SCNC: 97 MMOL/L (ref 98–107)
CO2 SERPL-SCNC: 22 MMOL/L (ref 22–31)
CREAT SERPL-MCNC: 0.76 MG/DL (ref 0.6–1.1)
EOSINOPHIL # BLD AUTO: 0 THOU/UL (ref 0–0.4)
EOSINOPHIL NFR BLD AUTO: 0 % (ref 0–6)
ERYTHROCYTE [DISTWIDTH] IN BLOOD BY AUTOMATED COUNT: 13.7 % (ref 11–14.5)
GFR SERPL CREATININE-BSD FRML MDRD: >60 ML/MIN/1.73M2
GLUCOSE BLD-MCNC: 136 MG/DL (ref 70–125)
HCO3, VENOUS, CALC - HISTORICAL: 25.9 MMOL/L (ref 24–30)
HCT VFR BLD AUTO: 36.8 % (ref 35–47)
HGB BLD-MCNC: 12.2 G/DL (ref 12–16)
IMM GRANULOCYTES # BLD: 0 THOU/UL
IMM GRANULOCYTES NFR BLD: 1 %
LACTATE SERPL-SCNC: 1.6 MMOL/L (ref 0.7–2)
LYMPHOCYTES # BLD AUTO: 1.4 THOU/UL (ref 0.8–4.4)
LYMPHOCYTES NFR BLD AUTO: 24 % (ref 20–40)
MCH RBC QN AUTO: 31.5 PG (ref 27–34)
MCHC RBC AUTO-ENTMCNC: 33.2 G/DL (ref 32–36)
MCV RBC AUTO: 95 FL (ref 80–100)
MONOCYTES # BLD AUTO: 0.2 THOU/UL (ref 0–0.9)
MONOCYTES NFR BLD AUTO: 4 % (ref 2–10)
NEUTROPHILS # BLD AUTO: 4.1 THOU/UL (ref 2–7.7)
NEUTROPHILS NFR BLD AUTO: 72 % (ref 50–70)
OXYHEMOGLOBIN (VBG) - HISTORICAL: 58.3 % (ref 70–75)
OXYHGB MFR BLDV: 59.4 % (ref 70–75)
PCO2 BLDV: 46 MM HG (ref 35–50)
PH BLDV: 7.4 [PH] (ref 7.35–7.45)
PLATELET # BLD AUTO: 224 THOU/UL (ref 140–440)
PMV BLD AUTO: 9.4 FL (ref 8.5–12.5)
PO2 BLDV: 32 MM HG (ref 25–47)
POTASSIUM BLD-SCNC: 3.8 MMOL/L (ref 3.5–5)
PROT SERPL-MCNC: 7.3 G/DL (ref 6–8)
RBC # BLD AUTO: 3.87 MILL/UL (ref 3.8–5.4)
SARS-COV-2 PCR RESULT-HE - HISTORICAL: NEGATIVE
SODIUM SERPL-SCNC: 133 MMOL/L (ref 136–145)
TROPONIN I SERPL-MCNC: <0.01 NG/ML (ref 0–0.29)
WBC: 5.7 THOU/UL (ref 4–11)

## 2021-07-02 ENCOUNTER — COMMUNICATION - HEALTHEAST (OUTPATIENT)
Dept: SCHEDULING | Facility: CLINIC | Age: 86
End: 2021-07-02

## 2021-07-02 RX ORDER — ALBUTEROL SULFATE 90 UG/1
2 AEROSOL, METERED RESPIRATORY (INHALATION) EVERY 4 HOURS PRN
Qty: 1 INHALER | Refills: 0 | Status: SHIPPED | OUTPATIENT
Start: 2021-07-02

## 2021-07-04 ENCOUNTER — COMMUNICATION - HEALTHEAST (OUTPATIENT)
Dept: SCHEDULING | Facility: CLINIC | Age: 86
End: 2021-07-04

## 2021-07-04 NOTE — ED PROVIDER NOTES
ED Provider Notes by Ronald Galvan DO at 7/1/2021  8:03 PM     Author: Ronald Galvan DO Service: Emergency Medicine Author Type: Physician    Filed: 7/4/2021  1:04 AM Date of Service: 7/1/2021  8:03 PM Status: Signed    : Ronald Galvan DO (Physician)       EMERGENCY DEPARTMENT NOTE     Name: Norberto Real    Age/Sex: 89 y.o. female   MRN: [id number]   Evaluation Date & Time:  7/1/2021  7:55 PM    PCP:    Nenita Lopez MD   ED Provider: Ronald Galvan D.O.       CHIEF COMPLAINT      Chief Complaint   Patient presents with   ? Shortness of Breath   ? Fatigue       DIAGNOSIS & DISPOSITION     1. Chronic cough    2. Dyspnea, unspecified type      DISPOSITION: Home    At the conclusion of the encounter I discussed the results of all of the tests and the disposition. The questions were answered. The patient or family acknowledged understanding and was agreeable with the care plan.    TOTAL CRITICAL CARE TIME (EXCLUDING PROCEDURES): Not applicable    PROCEDURES:   None    EMERGENCY DEPARTMENT COURSE/MEDICAL DECISION MAKING   8:15 PM I met with the patient to gather history and to perform my initial exam.  We discussed treatment options and the plan for care while in the Emergency Department. PPE: Provider wore N95 mask and eye protection.   10:48 PM Rechecked and updated the patient. No significant improvement after neb. On discussion with family, they think the patient may need IV fluids to help with her weakness. Will give her a small bolus. Without clear cause of her shortness of breath and cough, will plan to do a CTA.    89 y.o. female with relevant past history of hyperlipidemia, probable coronary artery disease, who presents to this ED by walk in for evaluation of shortness of breath and cough. Patient has had a cough, difficulty breathing, and has not been eating well for the last couple of days. She has also been vomiting occasionally after coughing hard.  Vital signs:/69   Pulse 87    Temp 99.4  F (37.4  C) (Oral)   Resp 27   Wt 105 lb (47.6 kg)   SpO2 97%   BMI 21.57 kg/m    Pertinent physical exam findings:  HEENT: Oropharynx appears normal  Cardiac: Regular rate and rhythm S1-S2,no murmur or rub  Pulmonary: Lungs are clear to auscultation bilaterally with equal breath sounds  Diagnostic studies:  Imaging: Chest x-ray: Mild diffuse interstitial lobular thickening appears chronic.  No infiltrate.  No effusion.  THS: No pulmonary embolism.  Chronic small nodules within the upper lungs with probable prior granulomatous process.  Small loculated pleural fluid in the left apex decreased compared to prior CT March 2021  Lab: EKG nonischemic, troponin nonelevated, comprehensive metabolic profile and CBC within normal limits  Interventions: Albuterol nebulizer  Medical decision making: Patient is remained without hypoxemia.  Somewhat improved after nebulizer.  Patient will be discharged continue albuterol nebulizer as needed for cough management with recommendation for follow-up with primary care physician and possible pulmonary referral.  Return criteria discussed and if progressive symptoms including development of shortness of breath not improved with metered-dose inhaler will return the emergency department.    ED INTERVENTIONS     Medications   albuterol nebulizer solution 2.5 mg (PROVENTIL) (2.5 mg Nebulization Given 7/1/21 2220)   sodium chloride 0.9% 500 mL (0 mL Intravenous Stopped 7/2/21 0135)   iopamidol solution 100 mL (ISOVUE-370) (100 mL Intravenous Given 7/2/21 0054)   albuterol inhaler 2 puff (PROAIR HFA;PROVENTIL HFA;VENTOLIN HFA) (2 puffs Inhalation Given 7/2/21 0148)       DISCHARGE MEDICATIONS        Medication List      START taking these medications    albuterol 90 mcg/actuation inhaler  Commonly known as: PROAIR HFA;PROVENTIL HFA;VENTOLIN HFA  Inhale 2 puffs every 4 (four) hours as needed (cough).        ASK your doctor about these medications    acetaminophen 500 MG  tablet  Commonly known as: TYLENOL     aspirin 81 MG EC tablet  Take 1 tablet (81 mg total) by mouth daily.     atorvastatin 80 MG tablet  Commonly known as: LIPITOR  Take 1 tablet (80 mg total) by mouth daily.           Where to Get Your Medications      You can get these medications from any pharmacy    Bring a paper prescription for each of these medications    albuterol 90 mcg/actuation inhaler           INFORMATION SOURCE AND LIMITATIONS    History/Exam limitations: None  Patient information was obtained from: Patient  Use of : Family members interpreting at bedside - Language Leloong    HISTORY OF PRESENT ILLNESS   Norberto Real female with a relevant past history of dyslipidemia and NSTEMI, who presents to this ED by walk in for evaluation of shortness of breath and cough.    Patient has had a cough, difficulty breathing, and has not been eating well for the last couple of days. She has also been vomiting occasionally after coughing hard. She denies chest pain, abdominal pain, diarrhea, urinary symptoms, or other complaints at this time.         REVIEW OF SYSTEMS:   Constitutional: Positive for decreased appetite. Negative for  fever.   HENT: Negative for URI symptoms or sore throat.    Eyes: Negative for visual disturbance.   Cardiac: Negative for  chest pain,palpitations, near syncope or syncope  Respiratory: Positive for cough and shortness of breath.  Gastrointestinal: Positive for vomiting. Negative for abdominal pain, nausea, constipation, diarrhea, rectal bleeding or melena.  Genitourinary: Negative for dysuria, flank pain and hematuria.   Musculoskeletal: Negative for back pain.   Skin: Negative for  rash  Neurological: Negative for dizziness, headache, syncope, speech difficulty, unilateral weakness or imbalance with walking.   Hematological: Negative for adenopathy. Does not bruise/bleed easily.   Psychiatric/Behavioral: Negative for confusion.       PATIENT HISTORY   History reviewed. No  pertinent past medical history.  Patient Active Problem List   Diagnosis   ? Localized Osteoarthritis Of The Wrist   ? Vision loss   ? Fracture of fourth metacarpal bone, closed, initial encounter   ? Risk for falls   ? Herpes zoster   ? Elevated troponin   ? NSTEMI (non-ST elevated myocardial infarction) (H)   ? Dyslipidemia     History reviewed. No pertinent surgical history.  Family History   Problem Relation Age of Onset   ? Cancer Neg Hx    ? Heart disease Neg Hx    ? Kidney disease Neg Hx      Social History     Socioeconomic History   ? Marital status:      Spouse name: None   ? Number of children: 7   ? Years of education: None   ? Highest education level: None   Occupational History     Employer: NOT EMPLOYED   Social Needs   ? Financial resource strain: None   ? Food insecurity     Worry: None     Inability: None   ? Transportation needs     Medical: None     Non-medical: None   Tobacco Use   ? Smoking status: Never Smoker   ? Smokeless tobacco: Never Used   ? Tobacco comment: No exposure to second hand smoking   Substance and Sexual Activity   ? Alcohol use: Never     Frequency: Never     Binge frequency: Never   ? Drug use: Never   ? Sexual activity: None   Lifestyle   ? Physical activity     Days per week: None     Minutes per session: None   ? Stress: None   Relationships   ? Social connections     Talks on phone: None     Gets together: None     Attends [employer] service: None     Active member of club or organization: None     Attends meetings of clubs or organizations: None     Relationship status: None   ? Intimate partner violence     Fear of current or ex partner: None     Emotionally abused: None     Physically abused: None     Forced sexual activity: None   Other Topics Concern   ? None   Social History Narrative    Lives with daughter-in-law.     No Known Allergies      OUTPATIENT MEDICATIONS     No current facility-administered medications on file prior to encounter.      Current  Outpatient Medications on File Prior to Encounter   Medication Sig   ? acetaminophen (TYLENOL) 500 MG tablet Take 500 mg by mouth every 6 (six) hours as needed for pain.   ? aspirin 81 MG EC tablet Take 1 tablet (81 mg total) by mouth daily.   ? atorvastatin (LIPITOR) 80 MG tablet Take 1 tablet (80 mg total) by mouth daily.         PHYSICAL EXAM     Vitals:    07/01/21 2230 07/01/21 2246 07/01/21 2315 07/02/21 0030   BP: 109/61 158/76 125/69    Pulse: 80 91 89 87   Resp: (!) 29 (!) 43 27    Temp:       TempSrc:       SpO2: 98% 98% 99% 97%   Weight:           Physical Exam   Constitutional: Oriented to person, place, and time. Appears well-developed and well-nourished.   HEENT:    Head: Atraumatic.    Nose: Nose normal.    Mouth/Throat: Oropharynx is clear and moist.    Eyes: EOM are normal. Pupils are equal, round, and reactive to light.   Neck: Normal range of motion. Neck supple.   Cardiovascular: Normal rate, regular rhythm and normal heart sounds.    Pulmonary/Chest: Normal effort. Diminished breath sounds on the right.   Abdominal: Soft. Bowel sounds are normal.   Musculoskeletal: Normal range of motion.   Neurological: Alert and oriented to person, place, and time. Normal strength.No sensory deficit. No cranial nerve deficit . Coordination serial fingers, finger-to-nose normal and gait normal.   Skin: Skin is warm and dry.   Psychiatric: Normal mood and affect. Behavior is normal. Thought content normal.       DIAGNOSTICS    LABORATORY FINDINGS (REVIEWED AND INTERPRETED):  Labs Reviewed   COMPREHENSIVE METABOLIC PANEL - Abnormal; Notable for the following components:       Result Value    Sodium 133 (*)     Chloride 97 (*)     Glucose 136 (*)     BUN 6 (*)     Bilirubin, Total 1.3 (*)     Albumin 3.3 (*)     All other components within normal limits    Narrative:     Fasting Glucose reference range is 70-99 mg/dL per  American Diabetes Association (ADA) guidelines.   HM1 (CBC WITH DIFF) - Abnormal; Notable for  the following components:    Neutrophils % 72 (*)     Immature Granulocyte % 1 (*)     All other components within normal limits   BLOOD GASES, VENOUS - Abnormal; Notable for the following components:    Oxyhemoglobin 58.3 (*)     O2 Sat, Venous 59.4 (*)     All other components within normal limits   TROPONIN I - Normal   BNP(B-TYPE NATRIURETIC PEPTIDE) - Normal   SARS-COV-2 (COVID-19)-PCR - Normal   LACTIC ACID - Normal   HM1(CBC WITH DIFFERENTIAL)    Narrative:     The following orders were created for panel order HM1(CBC and Differential).  Procedure                               Abnormality         Status                     ---------                               -----------         ------                     HM1 (CBC with Diff)[[id number]]          Abnormal            Final result                 Please view results for these tests on the individual orders.         IMAGING (REVIEWED AND INTERPRETED):  No results found.        ECG (REVIEWED AND INTERPRETED):   ECG:   Performed at: 20:30  HR:  83bpm  Rhythm: sinus rhythm with PVCs  Axis: -9  QRS duration: 76  QTC: 493  ST changes: Nonspecific ST and T wave changes in lateral leads. No T wave inversion,No Q wave  Interpretation: Normal sinus rhythm with PVCs. Nonspecific ST and T wave changes in lateral leads  Compared to most recent ECG from: 3/19/2021    I have reviewed the patient's ECG, with comments made as listed above. Please see scanned image for full interpretation.         I, Jyothi Gamez, am serving as a scribe to document services personally performed by Ronald Galvan D.O., based on my observation and the providers statements to me.    I, Ronald Galvan D.O., attest that Jyothi Gamez is acting in a scribe capacity, has observed my performance of the services and has documented them in accordance with my direction.    Ronald Galvan D.O.  EMERGENCY MEDICINE   07/04/21  [facility]  EMERGENCY DEPARTMENT  [address]  [facility] [address]  Dept: [phone]  Loc: [phone]     Ronald Galvan DO  07/04/21 0104

## 2021-07-04 NOTE — ED NOTES
ED Notes by Radha Baez RN at 7/1/2021  9:53 PM     Author: Radha Baez RN Service: Post Procedure Care Author Type: Registered Nurse    Filed: 7/1/2021  9:56 PM Date of Service: 7/1/2021  9:53 PM Status: Addendum    : Radha Baez RN (Registered Nurse)    Related Notes: Original Note by Radha Baez RN (Registered Nurse) filed at 7/1/2021  9:53 PM       Patient resting comfortably. Denies any needs or pain at this time.

## 2021-07-04 NOTE — ED TRIAGE NOTES
ED Triage Notes by Mónica Cisneros RN at 7/1/2021  5:30 PM     Author: Mónica Cisneros RN Service: -- Author Type: Registered Nurse    Filed: 7/1/2021  5:34 PM Date of Service: 7/1/2021  5:30 PM Status: Signed    : Mónica Cisneros RN (Registered Nurse)       Pt comes in with shortness of breath x2 days.  No known COVID exposure. Pt has vomited after coughing hard. At least partially vaccinated for COVID.

## 2021-07-04 NOTE — ED NOTES
ED Notes by Miguel Angel Beckwith NA at 7/1/2021  9:33 PM     Author: Miguel Angel Beckwith NA Service: -- Author Type: Nursing Assistant    Filed: 7/1/2021  9:33 PM Date of Service: 7/1/2021  9:33 PM Status: Signed    : Miguel Angel Beckwith NA (Nursing Assistant)       Pt went to restroom with daughter.

## 2021-07-06 VITALS — WEIGHT: 105 LBS | BODY MASS INDEX: 21.57 KG/M2

## 2021-07-06 LAB
ATRIAL RATE - MUSE: 83 BPM
DIASTOLIC BLOOD PRESSURE - MUSE: 70 MMHG
INTERPRETATION ECG - MUSE: NORMAL
P AXIS - MUSE: 32 DEGREES
PR INTERVAL - MUSE: 164 MS
QRS DURATION - MUSE: 76 MS
QT - MUSE: 420 MS
QTC - MUSE: 493 MS
R AXIS - MUSE: -9 DEGREES
SYSTOLIC BLOOD PRESSURE - MUSE: 117 MMHG
T AXIS - MUSE: 13 DEGREES
VENTRICULAR RATE- MUSE: 83 BPM